# Patient Record
Sex: MALE | Race: WHITE | NOT HISPANIC OR LATINO | Employment: FULL TIME | URBAN - METROPOLITAN AREA
[De-identification: names, ages, dates, MRNs, and addresses within clinical notes are randomized per-mention and may not be internally consistent; named-entity substitution may affect disease eponyms.]

---
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
## 2020-02-13 ENCOUNTER — OFFICE VISIT (OUTPATIENT)
Dept: URGENT CARE | Facility: CLINIC | Age: 20
End: 2020-02-13
Payer: COMMERCIAL

## 2020-02-13 VITALS
SYSTOLIC BLOOD PRESSURE: 110 MMHG | WEIGHT: 158 LBS | HEART RATE: 94 BPM | DIASTOLIC BLOOD PRESSURE: 70 MMHG | TEMPERATURE: 98.4 F | HEIGHT: 68 IN | BODY MASS INDEX: 23.95 KG/M2

## 2020-02-13 DIAGNOSIS — J02.9 SORE THROAT: Primary | ICD-10-CM

## 2020-02-13 LAB — S PYO AG THROAT QL: NEGATIVE

## 2020-02-13 PROCEDURE — 87880 STREP A ASSAY W/OPTIC: CPT | Performed by: PHYSICIAN ASSISTANT

## 2020-02-13 PROCEDURE — G0382 LEV 3 HOSP TYPE B ED VISIT: HCPCS | Performed by: PHYSICIAN ASSISTANT

## 2020-02-13 PROCEDURE — 87070 CULTURE OTHR SPECIMN AEROBIC: CPT | Performed by: PHYSICIAN ASSISTANT

## 2020-02-13 NOTE — PROGRESS NOTES
NAME: Isabella Mcdaniel is a 23 y o  male  : 2000    MRN: [id number]      Assessment and Plan   Sore throat [J02 9]  1  Sore throat  POCT rapid strepA    al mag oxide-diphenhydramine-lidocaine viscous (MAGIC MOUTHWASH) 1:1:1 suspension   Rapid strep culture ordered to rule out strep  Rapid strep was negative  At this time will send for strep culture  Exam findings are consistent with viral etiology  No abx warranted at this time  Advise Pt to use OTC Flonase for nasal congestion  Advise Pt to continue use of OTC Tylenol alternating with OTC Ibuprofen  If symptoms persist the next 2-3 days Pt should follow-up with PCP  Patient understands and agrees with treatment plans  Bijan Bowie was seen today for sore throat  Diagnoses and all orders for this visit:    Sore throat  -     POCT rapid strepA  -     al mag oxide-diphenhydramine-lidocaine viscous (MAGIC MOUTHWASH) 1:1:1 suspension; Swish and spit 10 mL every 4 (four) hours as needed for mouth pain or discomfort        Patient Instructions   There are no Patient Instructions on file for this visit  Proceed to ER if symptoms worsen  Chief Complaint     Chief Complaint   Patient presents with    Sore Throat     x1 week, pain in right ear, O2-98%         History of Present Illness     23year old presents c/o S/T  x 1 wk  Pt admits right ear pain   Pt denies headache, fever, chills, fatigue, n/v/d/c, rhinorrhea, nasal congestion,   post-nasal drip, ear pressure, sinus pain/ pressure, SOB, chest pain, difficulty breathing  Has taken OTC tylenol or ibuprofen today - last dose       Review of Systems   Review of Systems   Constitutional: Negative for chills, fatigue and fever  HENT: Positive for ear pain and sore throat  Negative for congestion, postnasal drip, rhinorrhea, sinus pressure and sinus pain  Respiratory: Negative for cough, chest tightness, shortness of breath and wheezing  Cardiovascular: Negative for chest pain and palpitations  Gastrointestinal: Negative for constipation, diarrhea, nausea and vomiting  Current Medications       Current Outpatient Medications:     albuterol (PROAIR HFA) 90 mcg/act inhaler, Inhale 2 puffs every 6 (six) hours as needed for wheezing, Disp: 1 Inhaler, Rfl: 1    acetaminophen (TYLENOL) 325 mg tablet, Take 650 mg by mouth every 6 (six) hours as needed for mild pain, Disp: , Rfl:     al mag oxide-diphenhydramine-lidocaine viscous (MAGIC MOUTHWASH) 1:1:1 suspension, Swish and spit 10 mL every 4 (four) hours as needed for mouth pain or discomfort, Disp: 90 mL, Rfl: 0    predniSONE 10 mg tablet, Take 6 tablets first day and decrease by one tablet daily until complete  (Patient not taking: Reported on 2/13/2020), Disp: 21 tablet, Rfl: 0    Current Allergies     Allergies as of 02/13/2020 - Reviewed 02/13/2020   Allergen Reaction Noted    Penicillins Hives 04/06/2018              Past Medical History:   Diagnosis Date    Asthma     exercise induced    Head injury     Mononucleosis     Wrist fracture        Past Surgical History:   Procedure Laterality Date    [place] REPAIR TYMPANIC MEMBRANE Bilateral 4/12/2018    Procedure: Idalia Gain;  Surgeon: Jacinta Tran MD;  Location: [facility] OR;  Service: ENT    TONSILLECTOMY      resolved    TYMPANOSTOMY TUBE PLACEMENT      WISDOM TOOTH EXTRACTION         Family History   Problem Relation Age of Onset    No Known Problems Mother     No Known Problems Father     Breast cancer Maternal Grandmother     Substance Abuse Family     Mental illness Neg Hx         family history         Medications have been verified      The following portions of the patient's history were reviewed and updated as appropriate: allergies, current medications, past family history, past medical history, past social history, past surgical history and problem list     Objective   /70   Pulse 94   Temp 98 4 °F (36 9 °C)   Ht 5' 8" (1 727 m)   Wt 71 7 kg (158 lb)   BMI 24 02 kg/m²      Physical Exam     Physical Exam   Constitutional: He is oriented to person, place, and time  He appears well-developed and well-nourished  No distress  HENT:   Head: Normocephalic  Right Ear: Hearing, tympanic membrane and ear canal normal    Left Ear: Hearing, tympanic membrane and ear canal normal    Mouth/Throat: Uvula is midline, oropharynx is clear and moist and mucous membranes are normal  No tonsillar exudate  Cardiovascular: Normal rate, regular rhythm and normal heart sounds  Exam reveals no gallop and no friction rub  No murmur heard  Pulmonary/Chest: Effort normal and breath sounds normal  No stridor  No respiratory distress  He has no wheezes  He has no rales  Neurological: He is alert and oriented to person, place, and time  Nursing note and vitals reviewed        Alta Kendall PA-C

## 2020-02-15 LAB — BACTERIA THROAT CULT: NORMAL

## 2020-06-24 ENCOUNTER — OFFICE VISIT (OUTPATIENT)
Dept: URGENT CARE | Facility: CLINIC | Age: 20
End: 2020-06-24
Payer: COMMERCIAL

## 2020-06-24 ENCOUNTER — APPOINTMENT (OUTPATIENT)
Dept: RADIOLOGY | Facility: CLINIC | Age: 20
End: 2020-06-24
Payer: COMMERCIAL

## 2020-06-24 VITALS
TEMPERATURE: 98.1 F | DIASTOLIC BLOOD PRESSURE: 67 MMHG | SYSTOLIC BLOOD PRESSURE: 110 MMHG | HEIGHT: 68 IN | OXYGEN SATURATION: 98 % | BODY MASS INDEX: 24.25 KG/M2 | RESPIRATION RATE: 16 BRPM | HEART RATE: 62 BPM | WEIGHT: 160 LBS

## 2020-06-24 DIAGNOSIS — S69.90XA THUMB INJURY, INITIAL ENCOUNTER: ICD-10-CM

## 2020-06-24 DIAGNOSIS — S63.601A SPRAIN OF RIGHT THUMB, INITIAL ENCOUNTER: Primary | ICD-10-CM

## 2020-06-24 PROCEDURE — 73140 X-RAY EXAM OF FINGER(S): CPT

## 2020-06-24 PROCEDURE — G0382 LEV 3 HOSP TYPE B ED VISIT: HCPCS | Performed by: PHYSICIAN ASSISTANT

## 2020-10-15 PROBLEM — G43.909 MIGRAINE WITHOUT STATUS MIGRAINOSUS, NOT INTRACTABLE: Chronic | Status: ACTIVE | Noted: 2020-10-15

## 2021-04-07 ENCOUNTER — OFFICE VISIT (OUTPATIENT)
Dept: FAMILY MEDICINE CLINIC | Facility: CLINIC | Age: 21
End: 2021-04-07
Payer: COMMERCIAL

## 2021-04-07 VITALS
OXYGEN SATURATION: 98 % | DIASTOLIC BLOOD PRESSURE: 68 MMHG | HEART RATE: 60 BPM | SYSTOLIC BLOOD PRESSURE: 122 MMHG | HEIGHT: 68 IN | WEIGHT: 181 LBS | BODY MASS INDEX: 27.43 KG/M2

## 2021-04-07 DIAGNOSIS — J45.990 EXERCISE-INDUCED ASTHMA: ICD-10-CM

## 2021-04-07 DIAGNOSIS — Z00.00 WELLNESS EXAMINATION: Primary | ICD-10-CM

## 2021-04-07 PROCEDURE — 99395 PREV VISIT EST AGE 18-39: CPT | Performed by: FAMILY MEDICINE

## 2021-04-07 PROCEDURE — 3008F BODY MASS INDEX DOCD: CPT | Performed by: FAMILY MEDICINE

## 2021-04-07 RX ORDER — ALBUTEROL SULFATE 90 UG/1
2 AEROSOL, METERED RESPIRATORY (INHALATION) EVERY 6 HOURS PRN
Qty: 18 G | Refills: 2 | Status: SHIPPED | OUTPATIENT
Start: 2021-04-07

## 2021-04-07 NOTE — PROGRESS NOTES
[address]  [facility]        NAME: Edwin Ontiveros is a 21 y o  male  : 2000    MRN: [id number]  DATE: 2021  TIME: 4:42 PM    Assessment and Plan   Wellness examination [K28 49]  7  Wellness examination           Patient Instructions     Patient Instructions   Meningitis vacc when avail---medically cleared for college          Chief Complaint     Chief Complaint   Patient presents with    Mass     left- underarm-- x 8months    Annual Exam     HM         History of Present Illness       Annual PE      Review of Systems   Review of Systems   Constitutional: Negative for fatigue, fever and unexpected weight change  HENT: Negative for congestion, sinus pain and sore throat  Eyes: Negative for visual disturbance  Respiratory: Negative for shortness of breath and wheezing  Cardiovascular: Negative for chest pain and palpitations  Gastrointestinal: Negative for abdominal pain, nausea and vomiting  Musculoskeletal: Negative  Negative for arthralgias and myalgias  Neurological: Negative for syncope, weakness and numbness  Psychiatric/Behavioral: Negative  Negative for confusion, dysphoric mood and suicidal ideas           Current Medications       Current Outpatient Medications:     albuterol (PROAIR HFA) 90 mcg/act inhaler, Inhale 2 puffs every 6 (six) hours as needed for wheezing (Patient taking differently: Inhale 2 puffs every 6 (six) hours as needed for wheezing (rarely uses) ), Disp: 1 Inhaler, Rfl: 1    Current Allergies     Allergies as of 2021 - Reviewed 2021   Allergen Reaction Noted    Penicillins Hives 2018            The following portions of the patient's history were reviewed and updated as appropriate: allergies, current medications, past family history, past medical history, past social history, past surgical history and problem list      Past Medical History:   Diagnosis Date    Asthma     exercise induced    Head injury     Mononucleosis     Wrist fracture        Past Surgical History:   Procedure Laterality Date    [place] REPAIR TYMPANIC MEMBRANE Bilateral 4/12/2018    Procedure: [facility];  Surgeon: Saulo Encarnacion MD;  Location: [facility];  Service: ENT    TONSILLECTOMY      resolved    TYMPANOSTOMY TUBE PLACEMENT      WISDOM TOOTH EXTRACTION         Family History   Problem Relation Age of Onset    No Known Problems Mother     No Known Problems Father     Breast cancer Maternal Grandmother     Substance Abuse Family     Mental illness Neg Hx         family history         Medications have been verified  Objective   /68   Pulse 60   Ht 5' 8" (1 727 m)   Wt 82 1 kg (181 lb)   SpO2 98%   BMI 27 52 kg/m²        Physical Exam     Physical Exam  Constitutional:       Appearance: He is well-developed  HENT:      Right Ear: Ear canal normal  Tympanic membrane is not injected  Left Ear: Ear canal normal  Tympanic membrane is not injected  Nose: Nose normal    Eyes:      General:         Right eye: No discharge  Left eye: No discharge  Conjunctiva/sclera: Conjunctivae normal       Pupils: Pupils are equal, round, and reactive to light  Neck:      Musculoskeletal: Normal range of motion and neck supple  Thyroid: No thyromegaly  Cardiovascular:      Rate and Rhythm: Normal rate and regular rhythm  Heart sounds: Normal heart sounds  No murmur  Pulmonary:      Effort: Pulmonary effort is normal  No respiratory distress  Breath sounds: Normal breath sounds  No wheezing  Abdominal:      General: Bowel sounds are normal  There is no distension  Palpations: Abdomen is soft  Tenderness: There is no abdominal tenderness  Musculoskeletal: Normal range of motion  Lymphadenopathy:      Cervical: No cervical adenopathy  Skin:     General: Skin is warm and dry  Neurological:      Mental Status: He is alert and oriented to person, place, and time   He is not disoriented  Sensory: No sensory deficit  Gait: Gait normal       Deep Tendon Reflexes: Reflexes are normal and symmetric  Psychiatric:         Speech: Speech normal          Behavior: Behavior normal          Thought Content:  Thought content normal          Judgment: Judgment normal

## 2021-06-12 ENCOUNTER — HOSPITAL ENCOUNTER (EMERGENCY)
Facility: HOSPITAL | Age: 21
Discharge: HOME | End: 2021-06-12
Attending: EMERGENCY MEDICINE | Admitting: EMERGENCY MEDICINE
Payer: COMMERCIAL

## 2021-06-12 VITALS
RESPIRATION RATE: 18 BRPM | DIASTOLIC BLOOD PRESSURE: 78 MMHG | SYSTOLIC BLOOD PRESSURE: 128 MMHG | TEMPERATURE: 97.9 F | OXYGEN SATURATION: 98 % | HEART RATE: 71 BPM

## 2021-06-12 DIAGNOSIS — H61.20 CERUMEN IMPACTION: Primary | ICD-10-CM

## 2021-06-12 PROCEDURE — 99282 EMERGENCY DEPT VISIT SF MDM: CPT

## 2021-06-12 PROCEDURE — 69209 REMOVE IMPACTED EAR WAX UNI: CPT | Performed by: EMERGENCY MEDICINE

## 2021-06-12 PROCEDURE — 99282 EMERGENCY DEPT VISIT SF MDM: CPT | Performed by: EMERGENCY MEDICINE

## 2021-06-12 RX ORDER — IBUPROFEN 600 MG/1
600 TABLET ORAL ONCE
Status: COMPLETED | OUTPATIENT
Start: 2021-06-12 | End: 2021-06-12

## 2021-06-12 RX ORDER — ACETAMINOPHEN 325 MG/1
975 TABLET ORAL ONCE
Status: COMPLETED | OUTPATIENT
Start: 2021-06-12 | End: 2021-06-12

## 2021-06-12 RX ADMIN — ACETAMINOPHEN 975 MG: 325 TABLET, FILM COATED ORAL at 22:44

## 2021-06-12 RX ADMIN — IBUPROFEN 600 MG: 600 TABLET, FILM COATED ORAL at 22:44

## 2021-06-13 NOTE — DISCHARGE INSTRUCTIONS
Please follow-up with your ENT as needed for further care, if symptoms worsen please return to the emergency department

## 2021-06-13 NOTE — ED PROVIDER NOTES
History  Chief Complaint   Patient presents with    Ear Problem     Increased pain and hearing loss in L ear starting this morning  27-year-old male with past medical history of perforated TM, hearing loss due to cerumen impaction currently undergoing treatments with Dr Sarika Saenz, ENT where he gets cerumen suctioned out every 3 months, has an appointment at the end of this month  Today noticed pressure, decreased hearing in his left ear, attempted to put peroxide drops and attempted to use a wet Q-tip to clean out the ear without any relief, no other medications taken prior to arrival   Currently complaining of left ear pain, radiating to his left jaw  Prior to Admission Medications   Prescriptions Last Dose Informant Patient Reported? Taking? albuterol (ProAir HFA) 90 mcg/act inhaler   No No   Sig: Inhale 2 puffs every 6 (six) hours as needed for wheezing      Facility-Administered Medications: None       Past Medical History:   Diagnosis Date    Asthma     exercise induced    Head injury     Mononucleosis     Wrist fracture        Past Surgical History:   Procedure Laterality Date    [place] REPAIR TYMPANIC MEMBRANE Bilateral 4/12/2018    Procedure: Perez Otoole;  Surgeon: Timmy Serrano MD;  Location: [facility];  Service: ENT    TONSILLECTOMY      resolved    TYMPANOSTOMY TUBE PLACEMENT      WISDOM TOOTH EXTRACTION         Family History   Problem Relation Age of Onset    No Known Problems Mother     No Known Problems Father     Breast cancer Maternal Grandmother     Substance Abuse Family     Mental illness Neg Hx         family history     I have reviewed and agree with the history as documented      E-Cigarette/Vaping    E-Cigarette Use Never User      E-Cigarette/Vaping Substances    Nicotine No     THC No     CBD No      Social History     Tobacco Use    Smoking status: Never Smoker    Smokeless tobacco: Never Used   Vaping Use    Vaping Use: Never used   Substance Use Topics    Alcohol use: No    Drug use: No       Review of Systems   Constitutional: Negative for appetite change, chills and fever  HENT: Negative for rhinorrhea and sore throat  Left ear pain   Eyes: Negative for photophobia and visual disturbance  Respiratory: Negative for cough and shortness of breath  Cardiovascular: Negative for chest pain and palpitations  Gastrointestinal: Negative for abdominal pain and diarrhea  Genitourinary: Negative for dysuria, frequency and urgency  Skin: Negative for rash  Neurological: Negative for dizziness and weakness  All other systems reviewed and are negative  Physical Exam  Physical Exam  Vitals signs and nursing note reviewed  Constitutional:       Appearance: He is well-developed  HENT:      Head: Normocephalic and atraumatic  Comments: Right TM partially occluded by cerumen, left TM completely occluded by impacted cerumen, there is no tenderness over the mastoid, no erythema of the external ear canal     Right Ear: External ear normal       Left Ear: External ear normal  There is impacted cerumen  Eyes:      Conjunctiva/sclera: Conjunctivae normal       Pupils: Pupils are equal, round, and reactive to light  Neck:      Musculoskeletal: Normal range of motion and neck supple  Vascular: No JVD  Trachea: No tracheal deviation  Cardiovascular:      Rate and Rhythm: Normal rate and regular rhythm  Heart sounds: Normal heart sounds  No murmur  No friction rub  No gallop  Pulmonary:      Effort: Pulmonary effort is normal  No respiratory distress  Breath sounds: No stridor  No wheezing or rales  Abdominal:      General: There is no distension  Palpations: Abdomen is soft  There is no mass  Tenderness: There is no abdominal tenderness  There is no guarding or rebound  Musculoskeletal: Normal range of motion  Skin:     General: Skin is warm and dry  Coloration: Skin is not pale        Findings: No erythema or rash  Neurological:      Mental Status: He is alert and oriented to person, place, and time  Cranial Nerves: No cranial nerve deficit  Vital Signs  ED Triage Vitals [06/12/21 2224]   Temperature Pulse Respirations Blood Pressure SpO2   97 9 °F (36 6 °C) 71 18 128/78 98 %      Temp Source Heart Rate Source Patient Position - Orthostatic VS BP Location FiO2 (%)   Temporal Monitor Sitting Right arm --      Pain Score       5           Vitals:    06/12/21 2224   BP: 128/78   Pulse: 71   Patient Position - Orthostatic VS: Sitting         Visual Acuity  Visual Acuity      Most Recent Value   L Pupil Size (mm)  3   R Pupil Size (mm)  3          ED Medications  Medications   acetaminophen (TYLENOL) tablet 975 mg (975 mg Oral Given 6/12/21 2244)   ibuprofen (MOTRIN) tablet 600 mg (600 mg Oral Given 6/12/21 2244)       Diagnostic Studies  Results Reviewed     None                 No orders to display              Procedures  Procedures         ED Course  ED Course as of Jun 13 0303   Sat Jun 12, 2021   2334 Irrigated the ear with warm tap water, the cerumen plug was removed with irrigation, TM appears grossly intact without any bleeding    Symptoms completely resolved including hearing loss and pain                                              MDM  Number of Diagnoses or Management Options  Diagnosis management comments: 49-year-old male with cerumen impaction of the left ear, will attempt to clear the  impaction otherwise patient will follow-up with ENT for further care      Disposition  Final diagnoses:   Cerumen impaction     Time reflects when diagnosis was documented in both MDM as applicable and the Disposition within this note     Time User Action Codes Description Comment    6/12/2021 11:34 PM Bijan Crawford Add [H61 20] Cerumen impaction       ED Disposition     ED Disposition Condition Date/Time Comment    Discharge Stable Sat Jun 12, 2021 11:34 PM Fabienne Blackwood discharge to home/self care             Follow-up Information     Follow up With Specialties Details Why Contact Info Additional [address], [place] Family Medicine Schedule an appointment as soon as possible for a visit   [address] [facility] [address]  [facility]  Emergency Department Emergency Medicine  If symptoms worsen [address] [place], [phone]  [address] [facility] Emergency Department, [address] [place], [address] Flores [address]          Discharge Medication List as of 6/12/2021 11:34 PM      CONTINUE these medications which have NOT CHANGED    Details   albuterol (ProAir HFA) 90 mcg/act inhaler Inhale 2 puffs every 6 (six) hours as needed for wheezing, Starting Wed 4/7/2021, Normal           No discharge procedures on file      PDMP Review     None          ED Provider  Electronically Signed by           Emily Hogan DO  06/13/21 6209

## 2021-10-28 ENCOUNTER — TELEPHONE (OUTPATIENT)
Dept: NEUROLOGY | Facility: CLINIC | Age: 21
End: 2021-10-28

## 2021-11-24 ENCOUNTER — OFFICE VISIT (OUTPATIENT)
Dept: FAMILY MEDICINE CLINIC | Facility: CLINIC | Age: 21
End: 2021-11-24
Payer: COMMERCIAL

## 2021-11-24 VITALS
SYSTOLIC BLOOD PRESSURE: 122 MMHG | DIASTOLIC BLOOD PRESSURE: 70 MMHG | OXYGEN SATURATION: 96 % | WEIGHT: 180 LBS | HEIGHT: 68 IN | BODY MASS INDEX: 27.28 KG/M2 | TEMPERATURE: 97.6 F | HEART RATE: 91 BPM

## 2021-11-24 DIAGNOSIS — J45.990 EXERCISE-INDUCED ASTHMA: ICD-10-CM

## 2021-11-24 DIAGNOSIS — L08.9 INFECTED CYST OF SKIN: Primary | ICD-10-CM

## 2021-11-24 DIAGNOSIS — L72.9 INFECTED CYST OF SKIN: Primary | ICD-10-CM

## 2021-11-24 DIAGNOSIS — G43.009 MIGRAINE WITHOUT AURA AND WITHOUT STATUS MIGRAINOSUS, NOT INTRACTABLE: Chronic | ICD-10-CM

## 2021-11-24 PROCEDURE — 3725F SCREEN DEPRESSION PERFORMED: CPT | Performed by: FAMILY MEDICINE

## 2021-11-24 PROCEDURE — 1036F TOBACCO NON-USER: CPT | Performed by: FAMILY MEDICINE

## 2021-11-24 PROCEDURE — 99214 OFFICE O/P EST MOD 30 MIN: CPT | Performed by: FAMILY MEDICINE

## 2021-11-24 PROCEDURE — 3008F BODY MASS INDEX DOCD: CPT | Performed by: FAMILY MEDICINE

## 2021-11-24 RX ORDER — SUMATRIPTAN 100 MG/1
TABLET, FILM COATED ORAL
Qty: 9 TABLET | Refills: 1 | Status: SHIPPED | OUTPATIENT
Start: 2021-11-24 | End: 2022-06-03 | Stop reason: SDUPTHER

## 2022-01-27 ENCOUNTER — OFFICE VISIT (OUTPATIENT)
Dept: NEUROLOGY | Facility: CLINIC | Age: 22
End: 2022-01-27
Payer: COMMERCIAL

## 2022-01-27 ENCOUNTER — TELEPHONE (OUTPATIENT)
Dept: NEUROLOGY | Facility: CLINIC | Age: 22
End: 2022-01-27

## 2022-01-27 VITALS
HEIGHT: 68 IN | WEIGHT: 172 LBS | SYSTOLIC BLOOD PRESSURE: 112 MMHG | DIASTOLIC BLOOD PRESSURE: 78 MMHG | TEMPERATURE: 96.4 F | BODY MASS INDEX: 26.07 KG/M2 | HEART RATE: 94 BPM

## 2022-01-27 DIAGNOSIS — H61.23 BILATERAL HEARING LOSS DUE TO CERUMEN IMPACTION: ICD-10-CM

## 2022-01-27 DIAGNOSIS — G47.00 INSOMNIA: ICD-10-CM

## 2022-01-27 DIAGNOSIS — S06.0X9A CONCUSSION: ICD-10-CM

## 2022-01-27 DIAGNOSIS — G43.009 MIGRAINE WITHOUT AURA AND WITHOUT STATUS MIGRAINOSUS, NOT INTRACTABLE: Primary | ICD-10-CM

## 2022-01-27 PROCEDURE — 99204 OFFICE O/P NEW MOD 45 MIN: CPT | Performed by: PSYCHIATRY & NEUROLOGY

## 2022-01-27 RX ORDER — TOPIRAMATE 25 MG/1
TABLET ORAL
Qty: 30 TABLET | Refills: 3 | Status: SHIPPED | OUTPATIENT
Start: 2022-01-27 | End: 2022-06-03 | Stop reason: SINTOL

## 2022-01-27 NOTE — ASSESSMENT & PLAN NOTE
Pt here for initial neuro evaluation  Pt with history of headaches, gordon in past 1 5 years  Pt notes paternal family history of headaches as well  Pt exam normal  Pt notes trigger with poor initiation and maintence of sleep  Pt notes nearly daily headaches  Following with ent for long standing ear issues  Pt had prior ct head related to head injury/ concussion in 2018 - no acute abn  Pt to have mri head  Check lyme titer occ photophobia  Sleep med referral  Start topamax as preventative  May also help with sleep at hs
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
## 2022-02-01 ENCOUNTER — OFFICE VISIT (OUTPATIENT)
Dept: SLEEP CENTER | Facility: HOSPITAL | Age: 22
End: 2022-02-01
Attending: PSYCHIATRY & NEUROLOGY
Payer: COMMERCIAL

## 2022-02-01 VITALS
WEIGHT: 172 LBS | HEIGHT: 68 IN | SYSTOLIC BLOOD PRESSURE: 112 MMHG | DIASTOLIC BLOOD PRESSURE: 78 MMHG | BODY MASS INDEX: 26.07 KG/M2

## 2022-02-01 DIAGNOSIS — G47.09 OTHER INSOMNIA: Primary | ICD-10-CM

## 2022-02-01 DIAGNOSIS — F41.9 ANXIETY AND DEPRESSION: ICD-10-CM

## 2022-02-01 DIAGNOSIS — J45.990 EXERCISE-INDUCED ASTHMA: ICD-10-CM

## 2022-02-01 DIAGNOSIS — E66.3 OVERWEIGHT (BMI 25.0-29.9): ICD-10-CM

## 2022-02-01 DIAGNOSIS — F32.A ANXIETY AND DEPRESSION: ICD-10-CM

## 2022-02-01 DIAGNOSIS — R40.0 DAYTIME SLEEPINESS: ICD-10-CM

## 2022-02-01 DIAGNOSIS — G43.009 MIGRAINE WITHOUT AURA AND WITHOUT STATUS MIGRAINOSUS, NOT INTRACTABLE: ICD-10-CM

## 2022-02-01 DIAGNOSIS — J35.1 TONSILLAR HYPERTROPHY: ICD-10-CM

## 2022-02-01 LAB
B BURGDOR IGG+IGM SER-ACNC: <0.91 ISR (ref 0–0.9)
B BURGDOR IGM SER IA-ACNC: <0.8 INDEX (ref 0–0.79)

## 2022-02-01 PROCEDURE — 1036F TOBACCO NON-USER: CPT | Performed by: INTERNAL MEDICINE

## 2022-02-01 PROCEDURE — 3008F BODY MASS INDEX DOCD: CPT | Performed by: INTERNAL MEDICINE

## 2022-02-01 PROCEDURE — 99244 OFF/OP CNSLTJ NEW/EST MOD 40: CPT | Performed by: INTERNAL MEDICINE
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
## 2022-02-01 NOTE — PROGRESS NOTES
Review of Systems      Genitourinary none   Cardiology none   Gastrointestinal none   Neurology frequent headaches, awaken with headache, need to move extremities, forgetfulness, poor concentration or confusion,  and difficulty with memory   Constitutional none   Integumentary none   Psychiatry anxiety, depression and aggressiveness or irritability   Musculoskeletal none   Pulmonary none   ENT none   Endocrine none   Hematological none

## 2022-02-01 NOTE — PATIENT INSTRUCTIONS
What you can do to improve your sleep: (Sleep Hygiene) Basic rules for a good night's sleep    Create a regular sleep schedule  This will help you form a sleep routine  Keep a record of your sleep patterns, and any sleeping problems you have  Bring the record to follow-up visits with healthcare providers  Avoid prolonged use of light-emitting screens before bedtime or watching TV in bed  Avoid forcing sleep  Do not take naps  Naps could make it hard for you to fall asleep at bedtime  Deal with your worries before bedtime  Keep your bedroom cool, quiet, and dark  Turn on white noise, such as a fan, to help you relax  Do not use your bed for any activity that will keep you awake  Do not read, exercise, eat, or watch TV in your bedroom  Get up if you do not fall asleep within 20 minutes  Move to another room and do something relaxing until you become sleepy  Limit caffeine, alcohol, nicotine and food to earlier in the day  Only drink caffeine in the morning  Do not drink alcohol within 6 hours of bedtime  Do not eat a heavy meal right before you go to bed  Avoid smoking, especially in the evening  Exercise regularly  Daily exercise will help you sleep better  Do not exercise within 4 hours of bedtime  Stimulus control therapy rules  1  Go to bed only when sleepy  2  Do not watch television, read, eat, or worry while in bed  Use bed only for sleep and sex  3  Get out of bed if unable to fall asleep within 20 minutes and go to another room  Return to bed only when sleepy  Repeat this step as many times as necessary throughout the night  4  Set an alarm clock to wake up at a fixed time each morning, including weekends  5  Do not take a nap during the day  Data from: [address] [place] [address], [address] [employer] Nonpharmacologic treatments of insomnia  J Clin Psychiatry 7745; 53:37  Go to AASM website for more information: Sleepeducation  org     Recommended Reading:  Book by authors Fabiola Domingo No More sleepless nights    Nursing Support:  When: Monday through Friday 7A-5PM except holidays  Where: Our direct line is [phone]  If you are having a true emergency please call 911  In the event that the line is busy or it is after hours please leave a voice message and we will return your call  Please speak clearly, leaving your full name, birth date, best number to reach you and the reason for your call  Medication refills: We will need the name of the medication, the dosage, the ordering provider, whether you get a 30 or 90 day refill, and the pharmacy name and address  Medications will be ordered by the provider only  Nurses cannot call in prescriptions  Please allow 7 days for medication refills  Physician requested updates: If your provider requested that you call with an update after starting medication, please be ready to provide us the medication and dosage, what time you take your medication, the time you attempt to fall asleep, time you fall asleep, when you wake up, and what time you get out of bed  Sleep Study Results: We will contact you with sleep study results and/or next steps after the physician has reviewed your testing

## 2022-02-19 ENCOUNTER — HOSPITAL ENCOUNTER (OUTPATIENT)
Dept: MRI IMAGING | Facility: HOSPITAL | Age: 22
Discharge: HOME | End: 2022-02-19
Attending: PSYCHIATRY & NEUROLOGY
Payer: COMMERCIAL

## 2022-02-19 DIAGNOSIS — S06.0X9A CONCUSSION: ICD-10-CM

## 2022-02-19 DIAGNOSIS — H61.23 BILATERAL HEARING LOSS DUE TO CERUMEN IMPACTION: ICD-10-CM

## 2022-02-19 DIAGNOSIS — G43.009 MIGRAINE WITHOUT AURA AND WITHOUT STATUS MIGRAINOSUS, NOT INTRACTABLE: ICD-10-CM

## 2022-02-19 PROCEDURE — 70551 MRI BRAIN STEM W/O DYE: CPT

## 2022-05-01 ENCOUNTER — HOSPITAL ENCOUNTER (EMERGENCY)
Facility: HOSPITAL | Age: 22
Discharge: HOME | End: 2022-05-01
Attending: EMERGENCY MEDICINE | Admitting: EMERGENCY MEDICINE
Payer: COMMERCIAL

## 2022-05-01 VITALS
TEMPERATURE: 98 F | RESPIRATION RATE: 20 BRPM | HEART RATE: 87 BPM | DIASTOLIC BLOOD PRESSURE: 65 MMHG | OXYGEN SATURATION: 100 % | SYSTOLIC BLOOD PRESSURE: 121 MMHG

## 2022-05-01 DIAGNOSIS — F10.929 ALCOHOL INTOXICATION (HCC): Primary | ICD-10-CM

## 2022-05-01 PROCEDURE — 99284 EMERGENCY DEPT VISIT MOD MDM: CPT

## 2022-05-01 PROCEDURE — 99282 EMERGENCY DEPT VISIT SF MDM: CPT | Performed by: EMERGENCY MEDICINE
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
## 2022-06-01 ENCOUNTER — TELEPHONE (OUTPATIENT)
Dept: NEUROLOGY | Facility: CLINIC | Age: 22
End: 2022-06-01

## 2022-06-03 ENCOUNTER — OFFICE VISIT (OUTPATIENT)
Dept: NEUROLOGY | Facility: CLINIC | Age: 22
End: 2022-06-03
Payer: COMMERCIAL

## 2022-06-03 VITALS
HEART RATE: 73 BPM | DIASTOLIC BLOOD PRESSURE: 76 MMHG | HEIGHT: 68 IN | WEIGHT: 172 LBS | SYSTOLIC BLOOD PRESSURE: 104 MMHG | TEMPERATURE: 98 F | BODY MASS INDEX: 26.07 KG/M2

## 2022-06-03 DIAGNOSIS — H61.23 BILATERAL HEARING LOSS DUE TO CERUMEN IMPACTION: Chronic | ICD-10-CM

## 2022-06-03 DIAGNOSIS — G43.009 MIGRAINE WITHOUT AURA AND WITHOUT STATUS MIGRAINOSUS, NOT INTRACTABLE: Primary | ICD-10-CM

## 2022-06-03 DIAGNOSIS — G47.00 INSOMNIA: ICD-10-CM

## 2022-06-03 DIAGNOSIS — G58.8 PUDENDAL NEURALGIA: Primary | ICD-10-CM

## 2022-06-03 PROCEDURE — 3008F BODY MASS INDEX DOCD: CPT | Performed by: PSYCHIATRY & NEUROLOGY

## 2022-06-03 PROCEDURE — 1036F TOBACCO NON-USER: CPT | Performed by: PSYCHIATRY & NEUROLOGY

## 2022-06-03 PROCEDURE — 99215 OFFICE O/P EST HI 40 MIN: CPT | Performed by: PSYCHIATRY & NEUROLOGY

## 2022-06-03 RX ORDER — GABAPENTIN 300 MG/1
CAPSULE ORAL
Qty: 60 CAPSULE | Refills: 3 | Status: SHIPPED | OUTPATIENT
Start: 2022-06-03

## 2022-06-03 RX ORDER — SUMATRIPTAN 100 MG/1
TABLET, FILM COATED ORAL
Qty: 9 TABLET | Refills: 1 | Status: SHIPPED | OUTPATIENT
Start: 2022-06-03

## 2022-06-03 NOTE — ASSESSMENT & PLAN NOTE
Seen by Dr Meryle Leavens with pt  Pt is willing to consider sleep eval   Pt to follow up back with sleep med for further eval of etiology of insomnia  eds may be contributing to headaches as well
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
## 2022-06-03 NOTE — ASSESSMENT & PLAN NOTE
Pt here today for neuro follow up  Overall pt doing ok  Pt notes long standing headaches with paternal history as well  Pt had updated mri head on 2/19/22 with no mass effect  Pt had paranasal sinus mucousal thickening  Pt follows regularly with ent due to ear drainage/ cleaning  Question sinuses also trigger for headaches  Pt also with long standing sleep issues  Seen by sleep med  Overdue for his follow up  Staff helped with appt today  Pt now off topamax as preventative, could not tolerate due to increased anxiety on med  Pt still with several weekly headaches  Rev preventative meds with pt  Rev multiple categories of preventatives- antidepressants, bp meds, anti sz meds  Rev gabapentin at hs to try as preventative and may also help with insomnia sxs at night  Pt to call if any issue with med  Rev pharmacokinetics of med with pt  Follow up with ent and sleep med recommended     Cont with sumatriptan for abortive therapy- no cardiac issues per pt

## 2022-06-03 NOTE — ASSESSMENT & PLAN NOTE
Cont follow up with ent  eval for any contribution to headaches from sinus disease as well as seen on mri head

## 2022-10-25 ENCOUNTER — TELEPHONE (OUTPATIENT)
Dept: NEUROLOGY | Facility: CLINIC | Age: 22
End: 2022-10-25

## 2022-10-27 ENCOUNTER — OFFICE VISIT (OUTPATIENT)
Dept: NEUROLOGY | Facility: CLINIC | Age: 22
End: 2022-10-27
Payer: COMMERCIAL

## 2022-10-27 VITALS
HEIGHT: 68 IN | WEIGHT: 182 LBS | TEMPERATURE: 98.3 F | BODY MASS INDEX: 27.58 KG/M2 | SYSTOLIC BLOOD PRESSURE: 122 MMHG | HEART RATE: 77 BPM | DIASTOLIC BLOOD PRESSURE: 82 MMHG

## 2022-10-27 DIAGNOSIS — G43.009 MIGRAINE WITHOUT AURA AND WITHOUT STATUS MIGRAINOSUS, NOT INTRACTABLE: ICD-10-CM

## 2022-10-27 DIAGNOSIS — G47.00 INSOMNIA: ICD-10-CM

## 2022-10-27 DIAGNOSIS — R68.2 DRY MOUTH: Primary | ICD-10-CM

## 2022-10-27 PROCEDURE — 99214 OFFICE O/P EST MOD 30 MIN: CPT | Performed by: PSYCHIATRY & NEUROLOGY

## 2022-10-27 RX ORDER — GABAPENTIN 300 MG/1
CAPSULE ORAL
Qty: 90 CAPSULE | Refills: 3 | Status: SHIPPED | OUTPATIENT
Start: 2022-10-27
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
## 2022-10-27 NOTE — LETTER
October 27, 2022     Patient: Guilherme Keenan  YOB: 2000  Date of Visit: 10/27/2022      To Whom it May Concern:    Guilherme Keenan is under my professional care  Wes Garcia was seen in my office on 10/27/2022  If you have any questions or concerns, please don't hesitate to call           Sincerely,          Vish Cruz MD        CC:   No Recipients

## 2022-10-27 NOTE — ASSESSMENT & PLAN NOTE
Pt here for neuro follow up  Overall pt reports he is doing much better  Pt feels his headache freq and severity reduced since last visit  Pt is tolerating the gabapentin as preventative fairly well  Pt self increased to 3 tabs at hs ie 900 mg  Pt does note occ dryness of mouth with the gabapentin but not interested in discontinue due to benefits of med on headaches overall  Will also check sjogren labs  Pt using imitrex for breakthru migraine  No cp or sob  Exam stable  Updated gabapentin rx in emr   rx sent to pharm  Pt to call for any worsening in sxs  To consider increase to 1200 mg in future as split doses ie 600 mg bid if any worsening in character or freq of headaches

## 2023-03-13 DIAGNOSIS — G43.009 MIGRAINE WITHOUT AURA AND WITHOUT STATUS MIGRAINOSUS, NOT INTRACTABLE: ICD-10-CM

## 2023-03-14 RX ORDER — GABAPENTIN 300 MG/1
CAPSULE ORAL
Qty: 90 CAPSULE | Refills: 2 | Status: SHIPPED | OUTPATIENT
Start: 2023-03-14

## 2023-05-22 ENCOUNTER — TELEPHONE (OUTPATIENT)
Dept: NEUROLOGY | Facility: CLINIC | Age: 23
End: 2023-05-22

## 2023-06-02 ENCOUNTER — OFFICE VISIT (OUTPATIENT)
Dept: NEUROLOGY | Facility: CLINIC | Age: 23
End: 2023-06-02

## 2023-06-02 VITALS
TEMPERATURE: 97.9 F | HEIGHT: 68 IN | DIASTOLIC BLOOD PRESSURE: 74 MMHG | BODY MASS INDEX: 27.58 KG/M2 | HEART RATE: 66 BPM | SYSTOLIC BLOOD PRESSURE: 112 MMHG | WEIGHT: 182 LBS

## 2023-06-02 DIAGNOSIS — G43.009 MIGRAINE WITHOUT AURA AND WITHOUT STATUS MIGRAINOSUS, NOT INTRACTABLE: ICD-10-CM

## 2023-06-02 RX ORDER — GABAPENTIN 300 MG/1
CAPSULE ORAL
Qty: 120 CAPSULE | Refills: 3 | Status: SHIPPED | OUTPATIENT
Start: 2023-06-02

## 2023-06-02 RX ORDER — SUMATRIPTAN 100 MG/1
TABLET, FILM COATED ORAL
Qty: 9 TABLET | Refills: 1 | Status: SHIPPED | OUTPATIENT
Start: 2023-06-02

## 2023-06-02 RX ORDER — CLOBETASOL PROPIONATE 0.5 MG/G
CREAM TOPICAL
COMMUNITY
Start: 2023-05-25
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
## 2023-06-02 NOTE — ASSESSMENT & PLAN NOTE
Pt here today for neuro followup  Overall doing well  No new sxs  Pt notes no worsening in severity of headaches  However some increase in freq of headaches  Pt did well initially with gabapentin as preventative  Currently at 900 mg daily  Will increase to 1200 mg daily ie 600 mg bid  Pt also ran out of his imitrex  Med refilled  No issues with sob or cp  No changes in exam  meds sent to pharmacy  Pt to call for any new issues  retn in 6 months

## 2023-06-19 DIAGNOSIS — G43.009 MIGRAINE WITHOUT AURA AND WITHOUT STATUS MIGRAINOSUS, NOT INTRACTABLE: ICD-10-CM

## 2023-06-19 RX ORDER — GABAPENTIN 300 MG/1
CAPSULE ORAL
Qty: 120 CAPSULE | Refills: 0 | Status: CANCELLED | OUTPATIENT
Start: 2023-06-19

## 2023-10-03 ENCOUNTER — OFFICE VISIT (OUTPATIENT)
Dept: NEUROLOGY | Facility: CLINIC | Age: 23
End: 2023-10-03
Payer: COMMERCIAL

## 2023-10-03 VITALS
TEMPERATURE: 98.1 F | SYSTOLIC BLOOD PRESSURE: 117 MMHG | RESPIRATION RATE: 18 BRPM | BODY MASS INDEX: 31.83 KG/M2 | HEART RATE: 102 BPM | WEIGHT: 210 LBS | OXYGEN SATURATION: 97 % | DIASTOLIC BLOOD PRESSURE: 70 MMHG | HEIGHT: 68 IN

## 2023-10-03 DIAGNOSIS — G43.009 MIGRAINE WITHOUT AURA AND WITHOUT STATUS MIGRAINOSUS, NOT INTRACTABLE: Primary | Chronic | ICD-10-CM

## 2023-10-03 PROCEDURE — 99214 OFFICE O/P EST MOD 30 MIN: CPT | Performed by: PHYSICIAN ASSISTANT

## 2023-10-03 RX ORDER — GABAPENTIN 300 MG/1
CAPSULE ORAL
Qty: 120 CAPSULE | Refills: 5 | Status: SHIPPED | OUTPATIENT
Start: 2023-10-03

## 2023-10-03 RX ORDER — SUMATRIPTAN 100 MG/1
TABLET, FILM COATED ORAL
Qty: 9 TABLET | Refills: 1 | Status: SHIPPED | OUTPATIENT
Start: 2023-10-03

## 2023-10-03 NOTE — PROGRESS NOTES
Patient ID: Blanca Rios is a 25 y.o. male. Assessment/Plan:    Migraine without status migrainosus, not intractable  Migraines are better controlled with the increase in gabapentin at timing of last visit. He is maintained on 600 mg twice daily and tolerating well. Migraine frequency has decreased, now only getting about 1 migraine per week, occasionally 2. Sumatriptan or even over-the-counter medications work well to abort his migraines. No change in character of the migraines. We discussed trying some supplements like magnesium and B2 to see if that even further reduces the frequency of his migraines. The gabapentin causes some dry mouth, so would not like to escalate therapy at this time. Plan: For migraine preventative therapy  - Continue gabapentin 600 mg twice daily  - Start magnesium oxide 400-500 mg daily and B2 (riboflavin) 400 mg daily. I let him know about a specific supplement called Migralief which has both of these supplements in it    For migraine abortive therapy  - At onset of migraine may use either OTC analgesics or sumatriptan 100 mg. He may repeat the sumatriptan in 2 hours if headache is not relieved. No more than 2 tablets in 24 hours    We reviewed lifestyle factors that contribute to headaches. Advised that the patient stay well-hydrated, eat regularly/not skip meals, get adequate sleep, and avoid known migraine triggers    Neurologic exam is stable. He will follow-up in 4-6 months or sooner if needed       Diagnoses and all orders for this visit:    Migraine without aura and without status migrainosus, not intractable  -     gabapentin (Neurontin) 300 mg capsule; 2 tabs po bid  -     SUMAtriptan (Imitrex) 100 mg tablet; Take 1 tablet at onset of migraine headache. May repeat x1 dose if not total resolution after 2 hours. Subjective:    HPI    Blanca Rios is a 25 y.o. male who presents today for follow up regarding migraines.   He has been followed by  [facility], last seen in June 2023. Review, patient initially presented in January 2022 for evaluation headaches. Patient reported increased headache frequency and severity over the last 1.5 years. He reported history of single concussion in 2018. He has family history of migraine in his father. He reported headaches nearly on a daily basis with varying intensity. He reported some issues initiating and maintaining sleep, but denied snoring. Noted triggers for migraines include sleep deprivation, changes in blood sugar (when he does not eat for long periods of time), dehydration, food triggers. Brain without contrast was completed 2/19/2022 and unremarkable. He was initially started on topiramate for migraine prevention but was unable to tolerate due to increased anxiety. Initiated gabapentin for preventative. He was continued on sumatriptan for abortive therapy. Today, patient reports he is doing well. At timing of his last visit in June 2023, his gabapentin was increased to 600 mg twice daily. He reports that this has reduced the frequency and the severity of his migraines. Previously was having 4-5 migraines per week, now having about 1/week, sometimes 2. At the onset of migraine he either uses an OTC medication, or the sumatriptan hand, depending on what he has on hand. Both work very well to abort his migraines. There have not been any changes in the character of his headaches. He notes the gabapentin can cause some dry mouth so he has been increasing his water intake. He tries to eat regularly. Sleep is still a bit problematic, but better overall. He is currently in school at [employer] for .     The following portions of the patient's history were reviewed and updated as appropriate: current medications, past family history, past medical history, past social history, past surgical history and problem list.       Objective:    Blood pressure 117/70, pulse 102, temperature 98.1 °F (36.7 °C), temperature source Temporal, resp. rate 18, height 5' 8" (1.727 m), weight 95.3 kg (210 lb), SpO2 97 %. Physical Exam  Constitutional:       Appearance: Normal appearance. HENT:      Head: Normocephalic and atraumatic. Eyes:      Extraocular Movements: EOM normal.      Pupils: Pupils are equal, round, and reactive to light. Neurological:      Mental Status: He is alert. Motor: Motor strength is normal.     Deep Tendon Reflexes: Reflexes are normal and symmetric. Psychiatric:         Mood and Affect: Mood normal.         Speech: Speech normal.         Behavior: Behavior normal.         Neurological Exam  Mental Status  Alert. Oriented to person, place, time and situation. Speech is normal. Language is fluent with no aphasia. Attention and concentration are normal.    Cranial Nerves  CN II: Visual fields full to confrontation. CN III, IV, VI: Extraocular movements intact bilaterally. Pupils equal round and reactive to light bilaterally. CN V: Facial sensation is normal.  CN VII: Full and symmetric facial movement. CN VIII: Hearing is normal.  CN IX, X: Palate elevates symmetrically  CN XI: Shoulder shrug strength is normal.  CN XII: Tongue midline without atrophy or fasciculations. Motor   Normal muscle tone. Strength is 5/5 throughout all four extremities. Sensory  Light touch is normal in upper and lower extremities. Reflexes  Deep tendon reflexes are 2+ and symmetric in all four extremities. Coordination  Right: Finger-to-nose normal.Left: Finger-to-nose normal.    Gait  Casual gait is normal including stance, stride, and arm swing. ROS:    Review of Systems   Constitutional: Negative for chills and fever. HENT: Negative for ear pain and sore throat. Eyes: Negative for pain and visual disturbance. Respiratory: Negative for cough and shortness of breath. Cardiovascular: Negative for chest pain and palpitations.    Gastrointestinal: Negative for abdominal pain and vomiting. Genitourinary: Negative for dysuria and hematuria. Musculoskeletal: Negative for arthralgias and back pain. Skin: Negative for color change and rash. Neurological: Positive for headaches (1-2 times a week). Negative for seizures and syncope. All other systems reviewed and are negative.     I personally reviewed and updated the ROS as appropriate

## 2023-10-03 NOTE — ASSESSMENT & PLAN NOTE
2/16/2017      Swapna Dewey  [address] [place] [address]        Dear Ms. Dewey,    Please review the enclosed prep instructions for your procedure with Dr. Adonis Lee.    Date:  April 6, 2017  Procedure Time:  1:30pm  Arrival Time: 12:30pm  Location:  [facility]  [address] [place]  [address]  [phone]  Please enter the main entrance, pass the glass elevator, and follow the orange signs for GI Services.     If you have any questions regarding these instructions or need to reschedule, please call [phone].    Thank you,    Belle NAVARRETE [phone]  Surgery Scheduler  Preadmission Department                                                Colonoscopy with Nulytely  Pre-Procedure Instructions        · Please pick-up a Nulytely Prep Kit at the pharmacy your physician sent the prescription to..  Do not mix the solution until the day before the procedure.  Bring these instructions with you when you go to the pharmacy.      TRANSPORTATION:  · Make arrangements for someone to drive you home after the procedure because you will be very drowsy. Please make these arrangements in advance. A taxi is not acceptable transportation. If you do not have transportation arranged, we will not be able to do the colonoscopy.  · Make arrangements for someone to stay with you or check in on you frequently the rest of the day/evening until the drowsiness has completely worn off.      CANCELLATION OR RESCHEDULING:  · Due to everyone's busy schedules, it is important that you keep your appointment. If you must reschedule or cancel your appointment, please notify your physician's office at least 48 hours in advance.      AFTER YOUR COLONOSCOPY:  You will receive after-procedure information and instructions. In addition:  · Do not plan on going to work or doing anything for the rest of the day.  · You may resume eating and drinking with no limitations following the  Migraines are better controlled with the increase in gabapentin at timing of last visit. He is maintained on 600 mg twice daily and tolerating well. Migraine frequency has decreased, now only getting about 1 migraine per week, occasionally 2. Sumatriptan or even over-the-counter medications work well to abort his migraines. No change in character of the migraines. We discussed trying some supplements like magnesium and B2 to see if that even further reduces the frequency of his migraines. The gabapentin causes some dry mouth, so would not like to escalate therapy at this time. Plan: For migraine preventative therapy  - Continue gabapentin 600 mg twice daily  - Start magnesium oxide 400-500 mg daily and B2 (riboflavin) 400 mg daily. I let him know about a specific supplement called Migralief which has both of these supplements in it    For migraine abortive therapy  - At onset of migraine may use either OTC analgesics or sumatriptan 100 mg. He may repeat the sumatriptan in 2 hours if headache is not relieved. No more than 2 tablets in 24 hours    We reviewed lifestyle factors that contribute to headaches. Advised that the patient stay well-hydrated, eat regularly/not skip meals, get adequate sleep, and avoid known migraine triggers    Neurologic exam is stable.   He will follow-up in 4-6 months or sooner if needed procedure.      ADDITIONAL INSTRUCTIONS:                                                 BOWEL PREPARATION:  To complete a successful colonoscopy, the bowel must be clean so that the physician can clearly view the colon. It is very important that you read all the instructions well in advance of the procedure. Without proper preparation, the colonoscopy will not be successful and the test may have to be repeated.    SPECIAL CONDITIONS:  Contact your primary care physician if you have diabetes and take insulin. You may need to have your insulin dose adjusted the day before and the day of the procedure.    Have some clear liquids (without red or purple coloring) available to drink or eat. Clear liquids (that you can see through) include water, coffee or tea without creamer, Gatorade/clear sports drinks, Popsicles, carbonated or non-carbonated soft drinks, Ty-aid or other flavored drinks, plain Jell-O without fruit or toppings, hard candy and broth.You may also have fruit juices that are clear such as apple or white grape. Do not have any juices that the fruit has pulp such as orange, pineapple, prunes.    If you experience rectal irritation due to frequent stooling, you may apply Desitin, Balmex, A and D ointment, or a similar product.    SEVEN (7) DAYS BEFORE THE PROCEDURE:  Do not take any Plavix, Brillinta, Effient, Aggrenox, Pepto-Bismol or iron supplements. If you have any questions or concerns about holding any of these medications, please contact your cardiologist or primary care physician.    Do not eat popcorn, seeds, nuts or whole kernel corn.     FIVE (5) DAYS BEFORE THE PROCEDURE:  Do not eat products with [place] (a fat substitute found in [employer] and Maple Plain Fat-Free chips) for 5 days before the procedure.    THREE (3) DAYS BEFORE THE PROCEDURE:  Do not take any Coumadin (warfarin), Xeralto  or Pradaxa. Please contact your cardiologist or primary care physician for any questions or  concerns regarding holding this medication.          DAY BEFORE YOUR COLONOSCOPY:  You may have only clear liquids to eat or drink all day long. You may not have any solid foods or dairy products, and you may not eat or drink anything that is red or purple. The more clear liquids you drink, the better off you will be.    Do not drink any alcoholic beverages for at least 24 hours before the procedure.    Do not take Lomotil, Imodium, Effer-syllium, Metamucil, Citrucel, Konsyl, Hydrocil, or FiberCon.    In the morning, mix the Nulytely prep with the flavor packet and one gallon of water, shake well to mix, and refrigerate to chill. Do not further dilute the prep in any way.    At 5:00 pm, start drinking the Nulytely prep. Drink one large (8-10 oz.) glass every 10-15 minutes. In most cases, loose, liquid bowel movements will begin within 30 minutes to one hour. You should remain within easy access of a bathroom. Continue to drink the prep regardless of stool frequency until the entire gallon is gone.    Continue to drink clear liquids (your physician recommends 64 ounces of Gatorade) throughout the evening. If you are a diabetic, please do not drink Gatorade since it is made with sugar. An alternative is Powerade Zero which can be bought at the same locations as Gatorade.     Do not eat or drink anything after midnight.    DAY OF YOUR COLONOSCOPY:    Do not eat or drink anything until after the procedure.    Take heart and/or blood pressure medications with a small sip of water. Do not take any other medications until after the procedure.     In the morning, your stools should have a clear to see-through cloudy yellow appearance with no formed substance. If your stools are darker or have formed substance, please call the location where your procedure is scheduled to be done and ask for the pre-op nurse, who will get further instructions from your physician.

## 2023-10-03 NOTE — PATIENT INSTRUCTIONS
Continue gabapentin 600mg twice a day  Would start magnesium oxide 400mg daily and b2 (riboflavin) 400mg daily.   There is a supplement called Migralief which has both in the same tablet  Can use sumatriptan 100mg at onset of migraine  Follow up in 4-6 months

## 2024-02-05 ENCOUNTER — OFFICE VISIT (OUTPATIENT)
Dept: NEUROLOGY | Facility: CLINIC | Age: 24
End: 2024-02-05
Payer: COMMERCIAL

## 2024-02-05 VITALS
WEIGHT: 208 LBS | BODY MASS INDEX: 31.52 KG/M2 | HEIGHT: 68 IN | HEART RATE: 85 BPM | TEMPERATURE: 98.2 F | DIASTOLIC BLOOD PRESSURE: 86 MMHG | SYSTOLIC BLOOD PRESSURE: 110 MMHG

## 2024-02-05 DIAGNOSIS — G43.909 MIGRAINE: Primary | ICD-10-CM

## 2024-02-05 PROCEDURE — 99214 OFFICE O/P EST MOD 30 MIN: CPT | Performed by: PSYCHIATRY & NEUROLOGY

## 2024-02-05 RX ORDER — RIZATRIPTAN BENZOATE 10 MG/1
10 TABLET ORAL AS NEEDED
Qty: 18 TABLET | Refills: 3 | Status: SHIPPED | OUTPATIENT
Start: 2024-02-05

## 2024-02-05 NOTE — ASSESSMENT & PLAN NOTE
Episodic migraine without aura with 1-2 headache days per month since starting gabapentin. Tolerating gabapentin well and happy with the results. On Imitrex for abortive treatment - having side effects of diffuse arthralgias when he takes it. Discussed trial of a different triptan to see if it is better tolerated and he is amenable. Will start Rizatriptan 10 mg to be taken at onset of headache and can be repeated in 2 hours if needed with max 2 tabs/24 hours and 3 tabs/week. Did not try mag/riboflavin for prevention but those are also options for the future as well. Lifestyle changes also reviewed. Will plan on following up in approximately 6 months or sooner if needed. Advised to call with any questions or concerns

## 2024-02-05 NOTE — PATIENT INSTRUCTIONS
Continue gabapentin 600 mg twice daily  Stop Imitex (sumatriptan)  Switch to Rizatriptan (Maxalt) - take 10 mg at the start of headache. If needed, you can repeat in 2 hours. Max 2 tabs/24 hours and 3 tabs/week.  If you have any chest pain, shortness of breath, etc please let us know  Please let us know if this helps or if you would prefer to go back to Imitrex

## 2024-02-05 NOTE — PROGRESS NOTES
Patient ID: Jomar Nathan is a 23 y.o. male.    Assessment/Plan:    Migraine  Episodic migraine without aura with 1-2 headache days per month since starting gabapentin. Tolerating gabapentin well and happy with the results. On Imitrex for abortive treatment - having side effects of diffuse arthralgias when he takes it. Discussed trial of a different triptan to see if it is better tolerated and he is amenable. Will start Rizatriptan 10 mg to be taken at onset of headache and can be repeated in 2 hours if needed with max 2 tabs/24 hours and 3 tabs/week. Did not try mag/riboflavin for prevention but those are also options for the future as well. Lifestyle changes also reviewed. Will plan on following up in approximately 6 months or sooner if needed. Advised to call with any questions or concerns       Diagnoses and all orders for this visit:    Migraine  -     rizatriptan (Maxalt) 10 mg tablet; Take 1 tablet (10 mg total) by mouth as needed for migraine Take at the onset of migraine; if symptoms continue or return, may take another dose at least 2 hours after first dose. Take no more than 2 doses in a day.           Subjective:    HPI    I had the pleasure of seeing your patient, Jomar Nathan, today in the Neurology clinic in follow up for migraines. He was last seen by Rhiannon Jacques PA-C on 10/3/23 and by Dr. Ohara on 6/2/23.     Today, patient reports that he is doing well. His headaches are much improved with gabapentin. Currently only having 1-2 headache days per month. Typically will take OTC meds and then if needed will take Imitrex 1-2 hours later. Does report side effects with Imitrex - will have diffuse joint pains that are bothersome. Has never tried another abortive. Did not try riboflavin or magnesium for prevention. Vision is stable. Has not been to an eye doctor but feels he may have astigmatism. No recent illnesses, ER visits, injuries, or admissions.     To review, patient initially presented in  "January 2022 for evaluation headaches.  Patient reported increased headache frequency and severity over the last 1.5 years.  He reported history of single concussion in 2018.  He has family history of migraine in his father.  He reported headaches nearly on a daily basis with varying intensity.  He reported some issues initiating and maintaining sleep, but denied snoring.  Noted triggers for migraines include sleep deprivation, changes in blood sugar (when he does not eat for long periods of time), dehydration, food triggers.  Brain without contrast was completed 2/19/2022 and unremarkable.  He was initially started on topiramate for migraine prevention but was unable to tolerate due to increased anxiety.  Initiated gabapentin for preventative.  He was continued on sumatriptan for abortive therapy. At last visit on 10/3/23, he noted improvement in his headaches since gabapentin was increased to 600 mg BID. Migraines were down to 1-2 per week. Typically has good response to either OTC medications or triptan for acute treatment. The gabapentin was causing some dry mouth so he increased his water intake. Was recommended to start mag oxide and riboflavin for headache prevention. Gabapentin and sumatriptan were continued without changes. Lifestyle changes were also reviewed.    The following portions of the patient's history were reviewed and updated as appropriate: allergies, current medications, past family history, past medical history, past social history, past surgical history, and problem list.         Objective:    Blood pressure 110/86, pulse 85, temperature 98.2 °F (36.8 °C), height 5' 8\" (1.727 m), weight 94.3 kg (208 lb).    Physical Exam  Vitals and nursing note reviewed.   Constitutional:       General: He is not in acute distress.     Appearance: Normal appearance. He is not ill-appearing.   HENT:      Head: Normocephalic and atraumatic.      Nose: Nose normal.      Mouth/Throat:      Mouth: Mucous " membranes are moist.      Pharynx: Oropharynx is clear.   Eyes:      General: Lids are normal.      Extraocular Movements: Extraocular movements intact.      Conjunctiva/sclera: Conjunctivae normal.      Pupils: Pupils are equal, round, and reactive to light.   Cardiovascular:      Rate and Rhythm: Normal rate.      Pulses: Normal pulses.   Pulmonary:      Effort: Pulmonary effort is normal. No respiratory distress.   Abdominal:      General: Abdomen is flat. There is no distension.   Musculoskeletal:      Cervical back: Normal range of motion and neck supple.      Right lower leg: No edema.      Left lower leg: No edema.   Skin:     General: Skin is warm and dry.      Capillary Refill: Capillary refill takes less than 2 seconds.   Neurological:      Motor: Motor strength is normal.     Deep Tendon Reflexes:      Reflex Scores:       Tricep reflexes are 2+ on the right side and 2+ on the left side.       Bicep reflexes are 2+ on the right side and 2+ on the left side.       Brachioradialis reflexes are 2+ on the right side and 2+ on the left side.       Patellar reflexes are 2+ on the right side and 2+ on the left side.       Achilles reflexes are 2+ on the right side and 2+ on the left side.  Psychiatric:         Mood and Affect: Mood normal.         Speech: Speech normal.         Behavior: Behavior normal.         Neurological Exam  Mental Status  Awake, alert and oriented to person, place and time. Speech is normal. Language is fluent with no aphasia.    Cranial Nerves  CN II: Visual fields full to confrontation. Right funduscopic exam: disc intact. Left funduscopic exam: disc intact.  CN III, IV, VI: Extraocular movements intact bilaterally. Normal lids and orbits bilaterally. Pupils equal round and reactive to light bilaterally.  CN V: Facial sensation is normal.  CN VII:  Right: There is no facial weakness.  Left: There is no facial weakness.  CN VIII: Hearing is normal.  CN IX, X: Palate elevates  symmetrically  CN XI: Shoulder shrug strength is normal.  CN XII: Tongue midline without atrophy or fasciculations.    Motor  Normal muscle bulk throughout. No fasciculations present. Normal muscle tone. No abnormal involuntary movements. Strength is 5/5 throughout all four extremities.    Sensory  Light touch is normal in upper and lower extremities. Proprioception is normal in upper and lower extremities.     Reflexes                                            Right                      Left  Brachioradialis                    2+                         2+  Biceps                                 2+                         2+  Triceps                                2+                         2+  Patellar                                2+                         2+  Achilles                                2+                         2+    Left pathological reflexes: Ankle clonus absent.    Coordination  Right: Finger-to-nose normal. Rapid alternating movement normal. Heel-to-shin normal.Left: Finger-to-nose normal. Rapid alternating movement normal. Heel-to-shin normal.    Gait  Casual gait is normal including stance, stride, and arm swing. Able to rise from chair without using arms.        ROS:  Review of systems as documented by the MA was reviewed in full by myself, Danette Garcia DO  Review of Systems   Constitutional:  Negative for appetite change, fatigue and fever.   HENT: Negative.  Negative for hearing loss, tinnitus, trouble swallowing and voice change.    Eyes: Negative.  Negative for photophobia, pain and visual disturbance.   Respiratory: Negative.  Negative for shortness of breath.    Cardiovascular: Negative.  Negative for palpitations.   Gastrointestinal: Negative.  Negative for nausea and vomiting.   Endocrine: Negative.  Negative for cold intolerance.   Genitourinary: Negative.  Negative for dysuria, frequency and urgency.   Musculoskeletal:  Negative for back pain, gait problem, myalgias, neck  pain and neck stiffness.   Skin: Negative.  Negative for rash.   Allergic/Immunologic: Negative.    Neurological:  Positive for headaches. Negative for dizziness, tremors, seizures, syncope, facial asymmetry, speech difficulty, weakness, light-headedness and numbness.        HA have reduced from 5 times a week to maybe 2 a month. Imitrex makes his joints stiff   Hematological: Negative.  Does not bruise/bleed easily.   Psychiatric/Behavioral: Negative.  Negative for confusion, hallucinations and sleep disturbance.    All other systems reviewed and are negative.    ======    I have discussed the patient's history, physical exam findings, assessment, and plan in detail with attending, Dr. Ohara    Thank you for allowing me to participate in the care of your patient, Jomar Nathan.    Danette Garcia, DO  [facility] Neurology Residency, PGY-4

## 2024-04-24 ENCOUNTER — OCCMED (OUTPATIENT)
Dept: URGENT CARE | Facility: CLINIC | Age: 24
End: 2024-04-24

## 2024-04-24 ENCOUNTER — APPOINTMENT (OUTPATIENT)
Dept: LAB | Facility: CLINIC | Age: 24
End: 2024-04-24

## 2024-04-24 DIAGNOSIS — Z02.1 PHYSICAL EXAM, PRE-EMPLOYMENT: ICD-10-CM

## 2024-04-24 DIAGNOSIS — Z02.1 PHYSICAL EXAM, PRE-EMPLOYMENT: Primary | ICD-10-CM

## 2024-04-24 LAB
MEV IGG SER QL IA: NORMAL
MUV IGG SER QL IA: NORMAL
RUBV IGG SERPL IA-ACNC: 53.3 IU/ML
VZV IGG SER QL IA: NORMAL

## 2024-04-24 PROCEDURE — 86787 VARICELLA-ZOSTER ANTIBODY: CPT

## 2024-04-24 PROCEDURE — 86762 RUBELLA ANTIBODY: CPT

## 2024-04-24 PROCEDURE — 86735 MUMPS ANTIBODY: CPT

## 2024-04-24 PROCEDURE — 36415 COLL VENOUS BLD VENIPUNCTURE: CPT

## 2024-04-24 PROCEDURE — 86480 TB TEST CELL IMMUN MEASURE: CPT

## 2024-04-24 PROCEDURE — 86765 RUBEOLA ANTIBODY: CPT

## 2024-04-25 LAB
GAMMA INTERFERON BACKGROUND BLD IA-ACNC: 0.03 IU/ML
M TB IFN-G BLD-IMP: NEGATIVE
M TB IFN-G CD4+ BCKGRND COR BLD-ACNC: 0.01 IU/ML
M TB IFN-G CD4+ BCKGRND COR BLD-ACNC: 0.02 IU/ML
MITOGEN IGNF BCKGRD COR BLD-ACNC: 9.97 IU/ML

## 2024-05-03 DIAGNOSIS — G43.009 MIGRAINE WITHOUT AURA AND WITHOUT STATUS MIGRAINOSUS, NOT INTRACTABLE: Chronic | ICD-10-CM

## 2024-05-09 DIAGNOSIS — G43.009 MIGRAINE WITHOUT AURA AND WITHOUT STATUS MIGRAINOSUS, NOT INTRACTABLE: Chronic | ICD-10-CM

## 2024-05-09 RX ORDER — GABAPENTIN 300 MG/1
CAPSULE ORAL
Qty: 120 CAPSULE | Refills: 5 | Status: CANCELLED | OUTPATIENT
Start: 2024-05-09

## 2024-05-09 RX ORDER — GABAPENTIN 300 MG/1
CAPSULE ORAL
Qty: 120 CAPSULE | Refills: 5 | Status: SHIPPED | OUTPATIENT
Start: 2024-05-09

## 2024-05-09 NOTE — TELEPHONE ENCOUNTER
Patient called in to have prescription refilled for gabapentin (Neurontin) 300 mg capsule. He stated that he ran out on Monday.   Please call when this can be completed.    Phone # [phone].  Thank you!

## 2024-05-09 NOTE — TELEPHONE ENCOUNTER
LOV: 5/3/24  Last RX: 10/3/2023 Gabapentin 300 mg capsules       New Rx pended below.  Rhiannon-Please sign if you are agreeable. Thank you.

## 2024-05-09 NOTE — TELEPHONE ENCOUNTER
There is already a pending request for this medication (that has not yet been routed to me).    His LOV was 2/5/2024 with Dr. Ohara    Deleting the request in this encounter since there is already a request from 5/4/24, and will refill from that encounter

## 2024-08-19 ENCOUNTER — TELEPHONE (OUTPATIENT)
Dept: NEUROLOGY | Facility: CLINIC | Age: 24
End: 2024-08-19

## 2024-08-19 NOTE — TELEPHONE ENCOUNTER
AMY to confirm your upcoming appt, 8/29/24 @8:30 am (8:15am) at the [facility] office, to confirm/RS if you are unable to keep this appt please call [phone]

## 2024-08-29 ENCOUNTER — OFFICE VISIT (OUTPATIENT)
Dept: NEUROLOGY | Facility: CLINIC | Age: 24
End: 2024-08-29
Payer: COMMERCIAL

## 2024-08-29 VITALS
DIASTOLIC BLOOD PRESSURE: 82 MMHG | HEART RATE: 83 BPM | TEMPERATURE: 98.1 F | WEIGHT: 225 LBS | HEIGHT: 70 IN | SYSTOLIC BLOOD PRESSURE: 116 MMHG | BODY MASS INDEX: 32.21 KG/M2

## 2024-08-29 DIAGNOSIS — G43.909 MIGRAINE: Primary | ICD-10-CM

## 2024-08-29 PROCEDURE — 99213 OFFICE O/P EST LOW 20 MIN: CPT | Performed by: PSYCHIATRY & NEUROLOGY

## 2024-08-29 NOTE — PROGRESS NOTES
"Patient ID: Jomar Nathan is a 23 y.o. male.    Assessment/Plan:    Migraine  Patient here today for neuro follow-up.  Patient last seen in February.   Since last visit, patient did have a vacation at the beach in July.  Patient notes he had missed his preventative gabapentin during his vacation and felt better cognitively and mood wise.  Patient felt less irritable.  Patient rechallenged with medication and still had similar mood symptoms.  Patient has been off gabapentin for almost 2 weeks now and feels better without medication.  No significant change in headache frequency or severity.  Patient was changed from Imitrex to Maxalt at last visit for his abortive medication.  This has been a good change.  Patient finds that the Maxalt is better tolerated and more efficacious.  At this juncture, patient will keep a log of his headache frequency and severity.  We will continue off preventative at this time.  Continue with Maxalt as main abortive.           Diagnoses and all orders for this visit:    Migraine           Subjective:    HPI  Patient here today for neuro follow-up.  Patient last seen on February 5, 2024.  Per my last note \"Pt last seen in Oct 2023. Pt notes no recent infections. No recent hospitalizations. No falls or trips. No change in vision. No loss of vision. No change in speech or swallowing. Pt notes migraines doing about the same. Pt remains on gabapentin as preventative with imitrex as abortive. He notes headache frequency better since last visit, now only 1-2 times per month. Current dosing of gabapentin 600mg bid. Pt denies any significant change in character of the headaches. Currently combination working for him. The only new issue is some joint pain when he takes imitrex med. Headaches improve but side effect of arthralgia. No chest pain or SOB. Pt exam stable. Pt recommended to continue with present dose of gabapentin. Will substitute maxalt for imitrex to see if better side effect profile. " "\"  Patient here today for neuro.  Patient last seen in February.  Since last visit, overall patient is doing well.  No hospitalizations no recent infections.  No nausea or vomiting.  No vertigo.  No change in bowel or bladder.  No change in speech or swallowing.  No facial paresthesias.  No loss of consciousness.  Patient notes he was recently on vacation for the summer at the beach in July.  Patient had forgotten to bring his gabapentin.  He was off medicine for that period of a week.  Patient did report that he felt better actually off of the gabapentin less irritable and mood was better.  Patient felt less cloudy and less heavy weighted.  Patient rechallenged himself with gabapentin upon return from vacation and felt resurgence of more irritability.  Patient has since taken himself off the gabapentin as his migraine preventative and feels better overall.  At timing of last appointment, we also opted to change patient's abortive medication from Imitrex to Maxalt.  Patient notes this change has been very beneficial.  Patient feels Maxalt is more efficacious for his headaches especially as the abortive.  Patient is sleeping into about noon at this time due to summer hours.  Patient is trying to get more regularity in his sleep-wake patterns as well as exercise and good nutrition and hydration.  Patient will call if any increase in headache frequency or severity.  At this time he has about 1 or 2 headaches per week which are helped with the Maxalt medication.    The following portions of the patient's history were reviewed and updated as appropriate: allergies, current medications, past family history, past medical history, past social history, past surgical history, and problem list and med rec and ros rev.         Objective:    Blood pressure 116/82, pulse 83, temperature 98.1 °F (36.7 °C), height 5' 10\" (1.778 m), weight 102 kg (225 lb).    Physical Exam  Constitutional:       General: He is not in acute " distress.     Appearance: He is not ill-appearing.   Eyes:      General: Lids are normal.      Extraocular Movements: Extraocular movements intact.      Pupils: Pupils are equal, round, and reactive to light.   Musculoskeletal:      Right lower leg: No edema.      Left lower leg: No edema.   Neurological:      Mental Status: He is alert.      Motor: Motor strength is normal.     Deep Tendon Reflexes: Reflexes are normal and symmetric.   Psychiatric:         Speech: Speech normal.         Neurological Exam  Mental Status  Alert. Recent and remote memory are intact. Speech is normal. Language is fluent with no aphasia. Attention and concentration are normal. Fund of knowledge is appropriate for level of education.    Cranial Nerves  CN II: Visual acuity is normal. Visual fields full to confrontation.  CN III, IV, VI: Extraocular movements intact bilaterally. Normal lids and orbits bilaterally. Pupils equal round and reactive to light bilaterally.  CN V: Facial sensation is normal.  CN VII: Full and symmetric facial movement.  CN VIII: Hearing is normal.  CN IX, X: Palate elevates symmetrically. Normal gag reflex.  CN XI: Shoulder shrug strength is normal.  CN XII: Tongue midline without atrophy or fasciculations.    Motor  Normal muscle bulk throughout. Normal muscle tone. No abnormal involuntary movements. Strength is 5/5 throughout all four extremities.    Sensory  Sensation is intact to light touch, pinprick, vibration and proprioception in all four extremities.    Reflexes  Deep tendon reflexes are 2+ and symmetric in all four extremities.    Coordination  Right: Finger-to-nose normal. Rapid alternating movement normal.Left: Finger-to-nose normal. Rapid alternating movement normal.    Gait  Casual gait is normal including stance, stride, and arm swing.        ROS:    Review of Systems   Constitutional:  Negative for appetite change, fatigue and fever.   HENT: Negative.  Negative for hearing loss, tinnitus, trouble  swallowing and voice change.    Eyes: Negative.  Negative for photophobia, pain and visual disturbance.   Respiratory: Negative.  Negative for shortness of breath.    Cardiovascular: Negative.  Negative for palpitations.   Gastrointestinal: Negative.  Negative for nausea and vomiting.   Endocrine: Negative.  Negative for cold intolerance.   Genitourinary: Negative.  Negative for dysuria, frequency and urgency.   Musculoskeletal:  Negative for back pain, gait problem, myalgias, neck pain and neck stiffness.   Skin: Negative.  Negative for rash.   Allergic/Immunologic: Negative.    Neurological: Negative.  Negative for dizziness, tremors, seizures, syncope, facial asymmetry, speech difficulty, weakness, light-headedness, numbness and headaches.   Hematological: Negative.  Does not bruise/bleed easily.   Psychiatric/Behavioral: Negative.  Negative for confusion, hallucinations and sleep disturbance.    All other systems reviewed and are negative.    Having side effects from gabapentin ?

## 2024-08-29 NOTE — ASSESSMENT & PLAN NOTE
Patient here today for neuro follow-up.  Patient last seen in February.   Since last visit, patient did have a vacation at the beach in July.  Patient notes he had missed his preventative gabapentin during his vacation and felt better cognitively and mood wise.  Patient felt less irritable.  Patient rechallenged with medication and still had similar mood symptoms.  Patient has been off gabapentin for almost 2 weeks now and feels better without medication.  No significant change in headache frequency or severity.  Patient was changed from Imitrex to Maxalt at last visit for his abortive medication.  This has been a good change.  Patient finds that the Maxalt is better tolerated and more efficacious.  At this juncture, patient will keep a log of his headache frequency and severity.  We will continue off preventative at this time.  Continue with Maxalt as main abortive.

## 2024-12-30 ENCOUNTER — TELEPHONE (OUTPATIENT)
Dept: NEUROLOGY | Facility: CLINIC | Age: 24
End: 2024-12-30

## 2024-12-30 NOTE — TELEPHONE ENCOUNTER
AMY to confirm your upcoming appt, 1/17/25 @8:00am (7:45am) at the [facility] office, to confirm/RS if you are unable to keep this appt please call [phone]

## 2025-01-03 ENCOUNTER — TELEPHONE (OUTPATIENT)
Age: 25
End: 2025-01-03

## 2025-01-17 ENCOUNTER — TELEPHONE (OUTPATIENT)
Dept: NEUROLOGY | Facility: CLINIC | Age: 25
End: 2025-01-17

## 2025-01-17 NOTE — TELEPHONE ENCOUNTER
Called patient Dr. Ohara will not be in office on the date of their appt. R/S appt. For 2/5/25 11:30[facility]

## 2025-02-05 ENCOUNTER — OFFICE VISIT (OUTPATIENT)
Dept: NEUROLOGY | Facility: CLINIC | Age: 25
End: 2025-02-05
Payer: COMMERCIAL

## 2025-02-05 VITALS
HEART RATE: 90 BPM | SYSTOLIC BLOOD PRESSURE: 110 MMHG | TEMPERATURE: 97.7 F | WEIGHT: 210 LBS | DIASTOLIC BLOOD PRESSURE: 80 MMHG | BODY MASS INDEX: 30.06 KG/M2 | HEIGHT: 70 IN

## 2025-02-05 DIAGNOSIS — G43.909 MIGRAINE: Primary | ICD-10-CM

## 2025-02-05 PROCEDURE — 99213 OFFICE O/P EST LOW 20 MIN: CPT | Performed by: PSYCHIATRY & NEUROLOGY

## 2025-02-05 NOTE — PROGRESS NOTES
"Name: Jomar Nathan      : 2000      MRN: [id number]  Encounter Provider: Emmy Ohara MD  Encounter Date: 2025   Encounter [facility]  :  Assessment & Plan  Migraine  Patient here today for neuro follow-up  Patient last seen in August.  Patient currently off preventative therapy.  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  Patient notes overall headaches have improved dramatically.    He will have 1-2 headaches per month.  Patient is using Maxalt for those occasions and it works very well as an abortive for him.  Due to the decreased frequency and severity of headaches, no preventatives needed at this time.  Neuroexam remains stable.  No family history of cardiac disease.  Continue with Maxalt as needed.  Keep headache log.  Patient to call for any increase in symptoms or frequency.  Return in 6 months.               History of Present Illness   HPI  Patient here today for neuro follow-up.  Patient last seen on 24.  Per my last note \"Pt last seen in Oct 2023.  Since last visit, overall patient is doing well.  No hospitalizations no recent infections.  No nausea or vomiting.  No vertigo.  No change in bowel or bladder.  No change in speech or swallowing.  No facial paresthesias.  No loss of consciousness.  Patient notes he was recently on vacation [place] beach in [place].  Patient had forgotten to bring his gabapentin.  He was off medicine for that period of a week.  Patient did report that he felt better actually off of the gabapentin less irritable and mood was better.  Patient felt less cloudy and less heavy weighted.  Patient rechallenged himself with gabapentin upon return from vacation and felt resurgence of more irritability.  Patient has since taken himself off the gabapentin as his migraine preventative and feels better overall.  At timing " "of last appointment, we also opted to change patient's abortive medication from Imitrex to Maxalt.  Patient notes this change has been very beneficial.  Patient feels Maxalt is more efficacious for his headaches especially as the abortive.  Patient is sleeping into about noon at this time due to summer hours.  Patient is trying to get more regularity in his sleep-wake patterns as well as exercise and good nutrition and hydration.\"    Patient here today for neuro follow-up.  Patient was seen on August 29, 2024.  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  Patient notes no recent hospitalizations.  No recent infections.  No falls or trips.  No change in speech or swallowing.  No change in bowel or bladder.  No vertigo.  No change in vision.  No loss of vision or diplopia.  Patient remains off of preventative for his headaches.  Patient had been tried on gabapentin but had noticed some irritability and some cognitive dullness.  Patient has been off gabapentin since our last visit.  Patient notes decreased frequency and severity of headaches.  He is only having 1-2 headaches per month and using the Maxalt which is efficacious for breakthrough headaches.  Patient also will be using occasional Advil well.  Is trying to stay hydrated with regular meals.  Reviewed triggers for migraines.  Overall patient is doing well.  No family history of cardiac disease.  No issues with the medication i.e. chest pressure or shortness of breath.  Patient is excited to be starting to his first job post college graduation.  Patient to call for any new symptoms.  The following portions of the patient's history were reviewed and updated as appropriate: allergies, current medications, past family history, past medical history, past social history, past surgical history, and problem list and med rec and ros rev.   Review of Systems " "  Constitutional:  Negative for appetite change, fatigue and fever.   HENT: Negative.  Negative for hearing loss, tinnitus, trouble swallowing and voice change.    Eyes: Negative.  Negative for photophobia, pain and visual disturbance.   Respiratory: Negative.  Negative for shortness of breath.    Cardiovascular: Negative.  Negative for palpitations.   Gastrointestinal: Negative.  Negative for nausea and vomiting.   Endocrine: Negative.  Negative for cold intolerance.   Genitourinary: Negative.  Negative for dysuria, frequency and urgency.   Musculoskeletal:  Negative for back pain, gait problem, myalgias, neck pain and neck stiffness.   Skin: Negative.  Negative for rash.   Allergic/Immunologic: Negative.    Neurological: Negative.  Negative for dizziness, tremors, seizures, syncope, facial asymmetry, speech difficulty, weakness, light-headedness, numbness and headaches.   Hematological: Negative.  Does not bruise/bleed easily.   Psychiatric/Behavioral: Negative.  Negative for confusion, hallucinations and sleep disturbance.    All other systems reviewed and are negative.   I have personally reviewed the MA's review of systems and made changes as necessary.  No new issues to address       Objective   /80   Pulse 90   Temp 97.7 °F (36.5 °C)   Ht 5' 10\" (1.778 m)   Wt 95.3 kg (210 lb)   BMI 30.13 kg/m²     Physical Exam  Constitutional:       General: He is not in acute distress.     Appearance: He is not ill-appearing.   Eyes:      General: Lids are normal.      Extraocular Movements: Extraocular movements intact.      Pupils: Pupils are equal, round, and reactive to light.   Musculoskeletal:      Right lower leg: No edema.      Left lower leg: No edema.   Neurological:      Mental Status: He is alert.      Motor: Motor strength is normal.     Deep Tendon Reflexes: Reflexes are normal and symmetric.   Psychiatric:         Speech: Speech normal.       Neurological Exam  Mental Status  Alert. Recent and " remote memory are intact. Speech is normal. Language is fluent with no aphasia. Attention and concentration are normal. Fund of knowledge is appropriate for level of education.    Cranial Nerves  CN II: Visual acuity is normal. Visual fields full to confrontation.  CN III, IV, VI: Extraocular movements intact bilaterally. Normal lids and orbits bilaterally. Pupils equal round and reactive to light bilaterally.  CN V: Facial sensation is normal.  CN VII: Full and symmetric facial movement.  CN VIII: Hearing is normal.  CN IX, X: Palate elevates symmetrically. Normal gag reflex.  CN XI: Shoulder shrug strength is normal.  CN XII: Tongue midline without atrophy or fasciculations.    Motor  Normal muscle bulk throughout. Normal muscle tone. No abnormal involuntary movements. Strength is 5/5 throughout all four extremities.    Sensory  Sensation is intact to light touch, pinprick, vibration and proprioception in all four extremities.    Reflexes  Deep tendon reflexes are 2+ and symmetric in all four extremities.    Coordination  Right: Finger-to-nose normal. Rapid alternating movement normal.Left: Finger-to-nose normal. Rapid alternating movement normal.    Gait  Casual gait is normal including stance, stride, and arm swing.

## 2025-02-05 NOTE — ASSESSMENT & PLAN NOTE
Patient here today for neuro follow-up  Patient last seen in August.  Patient currently off preventative therapy.  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  Patient notes overall headaches have improved dramatically.    He will have 1-2 headaches per month.  Patient is using Maxalt for those occasions and it works very well as an abortive for him.  Due to the decreased frequency and severity of headaches, no preventatives needed at this time.  Neuroexam remains stable.  No family history of cardiac disease.  Continue with Maxalt as needed.  Keep headache log.  Patient to call for any increase in symptoms or frequency.  Return in 6 months.

## 2025-03-12 DIAGNOSIS — G43.909 MIGRAINE: ICD-10-CM

## 2025-03-12 RX ORDER — RIZATRIPTAN BENZOATE 10 MG/1
10 TABLET ORAL AS NEEDED
Qty: 18 TABLET | Refills: 2 | Status: SHIPPED | OUTPATIENT
Start: 2025-03-12

## 2025-03-12 NOTE — TELEPHONE ENCOUNTER
Reason for call:   [x] Refill   [] Prior Auth  [] Other:     Office:   [] PCP/Provider -   [x] Specialty/Provider -  [facility]  Authorized By: Danette Garcia DO    Medication: rizatriptan (Maxalt) 10 mg tablet       Pharmacy: [facility] [address] [place] [address] [address]   Does the patient have enough for 3 days?   [] Yes   [x] No - Send as HP to POD    Mail Away Pharmacy   Does the patient have enough for 10 days?   [] Yes   [] No - Send as HP to POD

## 2025-07-21 ENCOUNTER — TELEPHONE (OUTPATIENT)
Dept: NEUROLOGY | Facility: CLINIC | Age: 25
End: 2025-07-21

## (undated) DEVICE — BLADE MYRINGOTOMY 377121

## (undated) DEVICE — SKIN MARKER DUAL TIP WITH RULER CAP, FLEXIBLE RULER AND LABELS: Brand: DEVON

## (undated) DEVICE — TUBING SUCTION 5MM X 12 FT

## (undated) DEVICE — SYRINGE 10ML LL

## (undated) DEVICE — GLOVE SRG BIOGEL 7.5

## (undated) DEVICE — 2000CC GUARDIAN II: Brand: GUARDIAN

## (undated) DEVICE — MAYO STAND COVER: Brand: CONVERTORS